# Patient Record
Sex: FEMALE | Race: OTHER | HISPANIC OR LATINO | Employment: UNEMPLOYED | URBAN - METROPOLITAN AREA
[De-identification: names, ages, dates, MRNs, and addresses within clinical notes are randomized per-mention and may not be internally consistent; named-entity substitution may affect disease eponyms.]

---

## 2020-01-31 ENCOUNTER — HOSPITAL ENCOUNTER (EMERGENCY)
Facility: HOSPITAL | Age: 60
Discharge: HOME | End: 2020-01-31
Attending: EMERGENCY MEDICINE | Admitting: EMERGENCY MEDICINE
Payer: COMMERCIAL

## 2020-01-31 VITALS
OXYGEN SATURATION: 98 % | WEIGHT: 116.4 LBS | TEMPERATURE: 98.3 F | DIASTOLIC BLOOD PRESSURE: 69 MMHG | SYSTOLIC BLOOD PRESSURE: 142 MMHG | RESPIRATION RATE: 18 BRPM | HEART RATE: 89 BPM

## 2020-01-31 DIAGNOSIS — M54.50 ACUTE LOW BACK PAIN: Primary | ICD-10-CM

## 2020-01-31 LAB
BACTERIA UR QL AUTO: ABNORMAL /HPF
BILIRUB UR QL STRIP: NEGATIVE
CLARITY UR: CLEAR
COLOR UR: YELLOW
GLUCOSE UR STRIP-MCNC: NEGATIVE MG/DL
HGB UR QL STRIP.AUTO: ABNORMAL
KETONES UR STRIP-MCNC: NEGATIVE MG/DL
LEUKOCYTE ESTERASE UR QL STRIP: NEGATIVE
NITRITE UR QL STRIP: NEGATIVE
NON-SQ EPI CELLS URNS QL MICRO: ABNORMAL /HPF
PH UR STRIP.AUTO: 7 [PH] (ref 4.5–8)
PROT UR STRIP-MCNC: NEGATIVE MG/DL
RBC #/AREA URNS AUTO: ABNORMAL /HPF
SP GR UR STRIP.AUTO: 1.01 (ref 1–1.03)
UROBILINOGEN UR QL STRIP.AUTO: 0.2 E.U./DL
WBC #/AREA URNS AUTO: ABNORMAL /HPF

## 2020-01-31 PROCEDURE — 81001 URINALYSIS AUTO W/SCOPE: CPT

## 2020-01-31 PROCEDURE — 99281 EMR DPT VST MAYX REQ PHY/QHP: CPT | Performed by: PHYSICIAN ASSISTANT

## 2020-01-31 PROCEDURE — 99283 EMERGENCY DEPT VISIT LOW MDM: CPT

## 2020-01-31 PROCEDURE — 96372 THER/PROPH/DIAG INJ SC/IM: CPT

## 2020-01-31 RX ORDER — METHOCARBAMOL 500 MG/1
500 TABLET, FILM COATED ORAL 4 TIMES DAILY
Qty: 40 TABLET | Refills: 0 | Status: SHIPPED | OUTPATIENT
Start: 2020-01-31 | End: 2020-02-10

## 2020-01-31 RX ORDER — IBUPROFEN 600 MG/1
600 TABLET ORAL EVERY 6 HOURS PRN
Qty: 30 TABLET | Refills: 0 | Status: SHIPPED | OUTPATIENT
Start: 2020-01-31 | End: 2020-02-10

## 2020-01-31 RX ORDER — KETOROLAC TROMETHAMINE 30 MG/ML
15 INJECTION, SOLUTION INTRAMUSCULAR; INTRAVENOUS ONCE
Status: COMPLETED | OUTPATIENT
Start: 2020-01-31 | End: 2020-01-31

## 2020-01-31 RX ADMIN — KETOROLAC TROMETHAMINE 15 MG: 30 INJECTION, SOLUTION INTRAMUSCULAR at 19:43

## 2020-02-01 NOTE — DISCHARGE INSTRUCTIONS
Warm compresses to the area  Medication as directed  FU with your family doctor, return to the ED for worsening symptoms  Also FU with your doctor as there was a trace amount of blood in the urine - FU for repeat

## 2020-02-01 NOTE — ED PROVIDER NOTES
History  Chief Complaint   Patient presents with    Back Pain     [employer] phone utilized for triage; patient Arabic speaking  Reports back pain for approximately 4 days, describes as a "spasm", radiates down leg  Denies urinary symptoms or abdominal pain  No N/V  No loss of bowel or bladder  Pt presents to the ED with low back pain that radiates into legs  x4 days - started after exercising at home  No falls  Feels muscles are in spasm  No change in bowel or bladder no abdominal pain or fevers or chills  Took nyquil last night  No other meds  Prior to Admission Medications   Prescriptions Last Dose Informant Patient Reported? Taking? cephalexin (KEFLEX) 500 mg capsule   No No   Sig: Take 1 capsule by mouth 2 (two) times a day  phenazopyridine (PYRIDIUM) 200 mg tablet   No No   Sig: Take 1 tablet by mouth 3 (three) times a day  Facility-Administered Medications: None       Past Medical History:   Diagnosis Date    Fibromyalgia     No known problems        Past Surgical History:   Procedure Laterality Date    NO PAST SURGERIES         History reviewed  No pertinent family history  I have reviewed and agree with the history as documented  Social History     Tobacco Use    Smoking status: Never Smoker    Smokeless tobacco: Never Used   Substance Use Topics    Alcohol use: No    Drug use: No        Review of Systems   All other systems reviewed and are negative  Physical Exam  Physical Exam   Constitutional: She appears well-developed and well-nourished  HENT:   Head: Normocephalic and atraumatic  Right Ear: Tympanic membrane and external ear normal    Left Ear: Tympanic membrane and external ear normal    Mouth/Throat: Oropharynx is clear and moist    Eyes: Conjunctivae and EOM are normal    Neck: Neck supple  Cardiovascular: Normal rate, regular rhythm, normal heart sounds and intact distal pulses     Pulmonary/Chest: Effort normal and breath sounds normal    Abdominal: Soft  Bowel sounds are normal    Musculoskeletal:        Lumbar back: She exhibits no bony tenderness  Back:    Ambulating well in ED  No calf pain or swelling  Lymphadenopathy:     She has no cervical adenopathy  Neurological: She is alert  Skin: Skin is warm  No rash noted  Psychiatric: She has a normal mood and affect  Her behavior is normal    Nursing note and vitals reviewed  Vital Signs  ED Triage Vitals [01/31/20 1910]   Temperature Pulse Respirations Blood Pressure SpO2   98 3 °F (36 8 °C) 89 18 142/69 98 %      Temp src Heart Rate Source Patient Position - Orthostatic VS BP Location FiO2 (%)   -- Monitor Sitting Right arm --      Pain Score       5           Vitals:    01/31/20 1910   BP: 142/69   Pulse: 89   Patient Position - Orthostatic VS: Sitting         Visual Acuity      ED Medications  Medications   ketorolac (TORADOL) injection 15 mg (15 mg Intramuscular Given 1/31/20 1943)       Diagnostic Studies  Results Reviewed     Procedure Component Value Units Date/Time    Urine Microscopic [[id number]] Collected:  01/31/20 1941    Lab Status:   In process Specimen:  Urine, Clean Catch Updated:  01/31/20 2000    POCT urinalysis dipstick [[id number]]  (Abnormal) Resulted:  01/31/20 1943    Lab Status:  Final result Specimen:  Urine Updated:  01/31/20 1943    Urine Macroscopic, POC [[id number]]  (Abnormal) Collected:  01/31/20 1941    Lab Status:  Final result Specimen:  Urine Updated:  01/31/20 1942     Color, UA Yellow     Clarity, UA Clear     pH, UA 7 0     Leukocytes, UA Negative     Nitrite, UA Negative     Protein, UA Negative mg/dl      Glucose, UA Negative mg/dl      Ketones, UA Negative mg/dl      Urobilinogen, UA 0 2 E U /dl      Bilirubin, UA Negative     Blood, UA Trace     Specific Harris, UA 1 015    Narrative:       CLINITEK RESULT                 No orders to display              Procedures  Procedures         ED Course                               MDM  Number of Diagnoses or Management Options  Acute low back pain: new and requires workup     Amount and/or Complexity of Data Reviewed  Clinical lab tests: reviewed    Risk of Complications, Morbidity, and/or Mortality  General comments: Discussed importance of FU and instrucitons reviewed w pt  Discussed FU for repeat urine as well  Patient Progress  Patient progress: improved        Disposition  Final diagnoses:   Acute low back pain     Time reflects when diagnosis was documented in both MDM as applicable and the Disposition within this note     Time User Action Codes Description Comment    1/31/2020  7:45 PM Shawna Reyes [M54 5] Acute low back pain       ED Disposition     ED Disposition Condition Date/Time Comment    Discharge Stable Fri Jan 31, 2020  7:44 PM Juanito Link discharge to home/self care              Follow-up Information     Follow up With Specialties Details Why [address] [place] Zoran Bowser MD Internal Medicine   [address] [address] [phone]            Discharge Medication List as of 1/31/2020  7:46 PM      START taking these medications    Details   ibuprofen (MOTRIN) 600 mg tablet Take 1 tablet (600 mg total) by mouth every 6 (six) hours as needed for mild pain for up to 10 days, Starting Fri 1/31/2020, Until Mon 2/10/2020, Print      methocarbamol (ROBAXIN) 500 mg tablet Take 1 tablet (500 mg total) by mouth 4 (four) times a day for 10 days, Starting Fri 1/31/2020, Until Mon 2/10/2020, Print         CONTINUE these medications which have NOT CHANGED    Details   cephalexin (KEFLEX) 500 mg capsule Take 1 capsule by mouth 2 (two) times a day , Starting 9/13/2016, Until Discontinued, Print      phenazopyridine (PYRIDIUM) 200 mg tablet Take 1 tablet by mouth 3 (three) times a day , Starting 9/13/2016, Until Discontinued, Print               ED Provider  Electronically Signed by           Gordy Dukes PA-C  01/31/20 2001

## 2020-02-03 ENCOUNTER — NURSE TRIAGE (OUTPATIENT)
Dept: PHYSICAL THERAPY | Facility: OTHER | Age: 60
End: 2020-02-03

## 2020-02-03 NOTE — TELEPHONE ENCOUNTER
Voice message left for patient to call back  Phone number and hours of business provided  This is the 1st attempt to reach the patient  Will defer per protocol         needed

## 2020-02-06 ENCOUNTER — NURSE TRIAGE (OUTPATIENT)
Dept: PHYSICAL THERAPY | Facility: OTHER | Age: 60
End: 2020-02-06

## 2020-02-06 NOTE — TELEPHONE ENCOUNTER
Voice mail/message left for patient to return call to [place] [phone] program including our hours of business and phone number with assistance of [facility] , [place] # [phone]  This is second attempt to contact patient regarding referral  Requested return call and to leave full name, spelling, and  if she needs to LM  Assured we will be able to use  services for call  Deferred per protocol for f/u